# Patient Record
Sex: MALE | Race: WHITE
[De-identification: names, ages, dates, MRNs, and addresses within clinical notes are randomized per-mention and may not be internally consistent; named-entity substitution may affect disease eponyms.]

---

## 2019-02-23 ENCOUNTER — HOSPITAL ENCOUNTER (EMERGENCY)
Dept: HOSPITAL 10 - FTE | Age: 24
Discharge: HOME | End: 2019-02-23
Payer: COMMERCIAL

## 2019-02-23 ENCOUNTER — HOSPITAL ENCOUNTER (EMERGENCY)
Dept: HOSPITAL 91 - FTE | Age: 24
Discharge: HOME | End: 2019-02-23
Payer: COMMERCIAL

## 2019-02-23 VITALS — HEART RATE: 98 BPM | DIASTOLIC BLOOD PRESSURE: 79 MMHG | RESPIRATION RATE: 16 BRPM | SYSTOLIC BLOOD PRESSURE: 116 MMHG

## 2019-02-23 VITALS — WEIGHT: 126.1 LBS | HEIGHT: 60 IN | BODY MASS INDEX: 24.76 KG/M2

## 2019-02-23 DIAGNOSIS — R10.13: Primary | ICD-10-CM

## 2019-02-23 DIAGNOSIS — R11.2: ICD-10-CM

## 2019-02-23 LAB
ADD MAN DIFF?: NO
ADD UMIC: YES
ALANINE AMINOTRANSFERASE: 34 IU/L (ref 13–69)
ALBUMIN/GLOBULIN RATIO: 1.34
ALBUMIN: 5.1 G/DL (ref 3.3–4.9)
ALKALINE PHOSPHATASE: 74 IU/L (ref 42–121)
ANION GAP: 14 (ref 5–13)
ASPARTATE AMINO TRANSFERASE: 29 IU/L (ref 15–46)
BASOPHIL #: 0 10^3/UL (ref 0–0.1)
BASOPHILS %: 0.2 % (ref 0–2)
BILIRUBIN,DIRECT: 0 MG/DL (ref 0–0.2)
BILIRUBIN,TOTAL: 0.4 MG/DL (ref 0.2–1.3)
BLOOD UREA NITROGEN: 8 MG/DL (ref 7–20)
CALCIUM: 10.5 MG/DL (ref 8.4–10.2)
CARBON DIOXIDE: 31 MMOL/L (ref 21–31)
CHLORIDE: 99 MMOL/L (ref 97–110)
CREATININE: 0.85 MG/DL (ref 0.61–1.24)
EOSINOPHILS #: 0 10^3/UL (ref 0–0.5)
EOSINOPHILS %: 0.1 % (ref 0–7)
GLOBULIN: 3.8 G/DL (ref 1.3–3.2)
GLUCOSE: 116 MG/DL (ref 70–220)
HEMATOCRIT: 49.5 % (ref 42–52)
HEMOGLOBIN: 16.1 G/DL (ref 14–18)
IMMATURE GRANS #M: 0.1 10^3/UL (ref 0–0.03)
IMMATURE GRANS % (M): 1.1 % (ref 0–0.43)
LIPASE: 56 U/L (ref 23–300)
LYMPHOCYTES #: 1.5 10^3/UL (ref 0.8–2.9)
LYMPHOCYTES %: 16.7 % (ref 15–51)
MEAN CORPUSCULAR HEMOGLOBIN: 26.3 PG (ref 29–33)
MEAN CORPUSCULAR HGB CONC: 32.5 G/DL (ref 32–37)
MEAN CORPUSCULAR VOLUME: 80.8 FL (ref 82–101)
MEAN PLATELET VOLUME: 8.7 FL (ref 7.4–10.4)
MONOCYTE #: 0.6 10^3/UL (ref 0.3–0.9)
MONOCYTES %: 7 % (ref 0–11)
NEUTROPHIL #: 6.5 10^3/UL (ref 1.6–7.5)
NEUTROPHILS %: 74.9 % (ref 39–77)
NUCLEATED RED BLOOD CELLS #: 0 10^3/UL (ref 0–0)
NUCLEATED RED BLOOD CELLS%: 0 /100WBC (ref 0–0)
PLATELET COUNT: 326 10^3/UL (ref 140–415)
POTASSIUM: 3.7 MMOL/L (ref 3.5–5.1)
RED BLOOD COUNT: 6.13 10^6/UL (ref 4.7–6.1)
RED CELL DISTRIBUTION WIDTH: 13.1 % (ref 11.5–14.5)
SODIUM: 144 MMOL/L (ref 135–144)
TOTAL PROTEIN: 8.9 G/DL (ref 6.1–8.1)
UR ASCORBIC ACID: NEGATIVE MG/DL
UR BILIRUBIN (DIP): NEGATIVE MG/DL
UR BLOOD (DIP): (no result) MG/DL
UR CLARITY: CLEAR
UR COLOR: YELLOW
UR GLUCOSE (DIP): NEGATIVE MG/DL
UR KETONES (DIP): (no result) MG/DL
UR LEUKOCYTE ESTERASE (DIP): NEGATIVE LEU/UL
UR NITRITE (DIP): NEGATIVE MG/DL
UR PH (DIP): 7 (ref 5–9)
UR RBC: 3 /HPF (ref 0–5)
UR SPECIFIC GRAVITY (DIP): 1.01 (ref 1–1.03)
UR TOTAL PROTEIN (DIP): NEGATIVE MG/DL
UR UROBILINOGEN (DIP): NEGATIVE MG/DL
UR WBC: 1 /HPF (ref 0–5)
WHITE BLOOD COUNT: 8.7 10^3/UL (ref 4.8–10.8)

## 2019-02-23 PROCEDURE — 85025 COMPLETE CBC W/AUTO DIFF WBC: CPT

## 2019-02-23 PROCEDURE — 76705 ECHO EXAM OF ABDOMEN: CPT

## 2019-02-23 PROCEDURE — 81001 URINALYSIS AUTO W/SCOPE: CPT

## 2019-02-23 PROCEDURE — 80053 COMPREHEN METABOLIC PANEL: CPT

## 2019-02-23 PROCEDURE — 99284 EMERGENCY DEPT VISIT MOD MDM: CPT

## 2019-02-23 PROCEDURE — 36415 COLL VENOUS BLD VENIPUNCTURE: CPT

## 2019-02-23 PROCEDURE — 83690 ASSAY OF LIPASE: CPT

## 2019-02-23 RX ADMIN — ONDANSETRON 1 MG: 4 TABLET, ORALLY DISINTEGRATING ORAL at 12:02

## 2019-02-23 RX ADMIN — ACETAMINOPHEN 1 MG: 500 TABLET, FILM COATED ORAL at 12:02

## 2019-02-23 RX ADMIN — ALUMINUM HYDROXIDE, MAGNESIUM HYDROXIDE, DIMETHICONE 1 ML: 200; 200; 20 SUSPENSION ORAL at 12:03

## 2019-02-26 NOTE — ERD
ER Documentation


Chief Complaint


Chief Complaint





AP PAIN X 3 DAYS WITH N/V





HPI


23-year-old male presents with epigastric pain and vomiting for last 3 days.  


Denies any fevers, lower abdominal pain, diarrhea, urinary complaints.





ROS


All systems reviewed and are negative except as per history of present illness.





Medications


Home Meds


Active Scripts


Ondansetron (Ondansetron Odt) 8 Mg Tab.rapdis, 8 MG PO Q6H PRN for NAUSEA AND/OR


VOMITING, #6 TAB


   Prov:HIPOLITO GUADARRAMA MD         2/23/19


Acetaminophen* (Tylophen*) 500 Mg Capsule, 1 CAP PO Q6H PRN for PAIN AND OR 


ELEVATED TEMP, #15 CAP


   Prov:HIPOLITO GUADARRAMA MD         2/23/19


Famotidine* (Famotidine*) 20 Mg Tablet, 20 MG PO BID for 14 Days, #30 TAB


   Prov:HIPOLITO GUADARRAMA MD         2/23/19





Allergies


Allergies:  


Coded Allergies:  


     No Known Allergy (Unverified , 2/23/19)





PMhx/Soc


Medical and Surgical Hx:  pt denies Medical Hx, pt denies Surgical Hx


Hx Alcohol Use:  No


Hx Substance Use:  No


Hx Tobacco Use:  No


Smoking Status:  Never smoker





FmHx


Family History:  No diabetes, No coronary disease, No other





Physical Exam


Vitals





Vital Signs


  Date      Temp  Pulse  Resp  B/P (MAP)   Pulse Ox  O2          O2 Flow    FiO2


Time                                                 Delivery    Rate


   2/23/19  98.8     98    16      116/79        99  Room Air


     14:12                           (91)


   2/23/19  36.8


     12:02


   2/23/19  98.2    120    18      141/88        99


     11:15                          (105)





Physical Exam


Const:   No acute distress


Head:   Atraumatic 


Eyes:    Normal Conjunctiva


ENT:    Normal External Ears, Nose and Mouth.


Neck:               Full range of motion. No meningismus.


Resp:   Clear to auscultation bilaterally


Cardio:   Regular rate and rhythm, no murmurs


Abd:    Soft, non tender, non distended. Normal bowel sounds


Skin:   No petechiae or rashes


Back:   No midline or flank tenderness


Ext:    No cyanosis, or edema


Neur:   Awake and alert


Psych:    Normal Mood and Affect


Result Diagram:  


2/23/19 1157                                                                    


           2/23/19 1157





Results 24 hrs





Laboratory Tests


              Test
                                  2/23/19
11:57


              White Blood Count                       8.7 10^3/ul


              Red Blood Count                        6.13 10^6/ul


              Hemoglobin                                16.1 g/dl


              Hematocrit                                   49.5 %


              Mean Corpuscular Volume                     80.8 fl


              Mean Corpuscular Hemoglobin                 26.3 pg


              Mean Corpuscular Hemoglobin
Concent      32.5 g/dl 



              Red Cell Distribution Width                  13.1 %


              Platelet Count                          326 10^3/UL


              Mean Platelet Volume                         8.7 fl


              Immature Granulocytes %                     1.100 %


              Neutrophils %                                74.9 %


              Lymphocytes %                                16.7 %


              Monocytes %                                   7.0 %


              Eosinophils %                                 0.1 %


              Basophils %                                   0.2 %


              Nucleated Red Blood Cells %             0.0 /100WBC


              Immature Granulocytes #               0.100 10^3/ul


              Neutrophils #                           6.5 10^3/ul


              Lymphocytes #                           1.5 10^3/ul


              Monocytes #                             0.6 10^3/ul


              Eosinophils #                           0.0 10^3/ul


              Basophils #                             0.0 10^3/ul


              Nucleated Red Blood Cells #             0.0 10^3/ul


              Urine Color                          YELLOW


              Urine Clarity                        CLEAR


              Urine pH                                        7.0


              Urine Specific Gravity                        1.013


              Urine Ketones                        TRACE mg/dL


              Urine Nitrite                        NEGATIVE mg/dL


              Urine Bilirubin                      NEGATIVE mg/dL


              Urine Urobilinogen                   NEGATIVE mg/dL


              Urine Leukocyte Esterase
            NEGATIVE
John/ul


              Urine Microscopic RBC                        3 /HPF


              Urine Microscopic WBC                        1 /HPF


              Urine Hemoglobin                           1+ mg/dL


              Urine Glucose                        NEGATIVE mg/dL


              Urine Total Protein                  NEGATIVE mg/dl


              Sodium Level                             144 mmol/L


              Potassium Level                          3.7 mmol/L


              Chloride Level                            99 mmol/L


              Carbon Dioxide Level                      31 mmol/L


              Anion Gap                                        14


              Blood Urea Nitrogen                         8 mg/dl


              Creatinine                               0.85 mg/dl


              Est Glomerular Filtrat Rate
mL/min   > 60 mL/min 



              Glucose Level                             116 mg/dl


              Calcium Level                            10.5 mg/dl


              Total Bilirubin                           0.4 mg/dl


              Direct Bilirubin                         0.00 mg/dl


              Indirect Bilirubin                        0.4 mg/dl


              Aspartate Amino Transf
(AST/SGOT)          29 IU/L 



              Alanine Aminotransferase
(ALT/SGPT)        34 IU/L 



              Alkaline Phosphatase                        74 IU/L


              Total Protein                              8.9 g/dl


              Albumin                                    5.1 g/dl


              Globulin                                  3.80 g/dl


              Albumin/Globulin Ratio                         1.34


              Lipase                                       56 U/L





Current Medications


 Medications
   Dose
          Sig/Brody
       Start Time
   Status  Last


 (Trade)       Ordered        Route
 PRN     Stop Time              Admin
Dose


                              Reason                                Admin


                40 ml          ONCE  STAT
    2/23/19       DC           2/23/19


Miscellaneous                 PO
            11:45
                       12:03




 Medication
                                2/23/19 11:47


 (Gi Cocktail


(2))


 Ondansetron    8 mg           ONCE  STAT
    2/23/19       DC           2/23/19


HCl
  (Zofran                 ODT
           11:45
                       12:02



Odt)                                         2/23/19 11:47


                500 mg         ONCE  STAT
    2/23/19       DC           2/23/19


Acetaminophen                 PO
            11:45
                       12:02




  (Tylenol                                  2/23/19 11:47


Tab)








Procedures/MDM


CBC and CMP, right upper quadrant ultrasound normal.  Patient given Zofran, GI 


cocktail and had resolution of pain and a reassuring abdominal exam on serial 


exam.  Patient has no significant signs of appendicitis, hepatobiliary disease, 


surgical abdomen.  He may have gastritis or self-limited viral illness.  Will be


discharged home with medication for symptomatic treatment, primary care follow-


up and return precautions.  He is advised to return the next 8-12 hours for 


lower abdominal pain, fevers, vomiting, new or worsening symptoms.  The patient 


was stable with no new complaints during the ER course. Clinically, there is no 


current evidence to suggest meningitis, sepsis, acute abdomen, pneumonia, 


stroke,  acute coronary syndrome, pulmonary embolism, aortic dissection or any 


other emergent condition appearing to require further evaluation or 


hospitalization.  Patient counseled regarding my diagnostic impression and care 


plan. Prior to discharge all questions answered. Pt agrees with treatment plan 


and understands strict return precautions. Pt is instructed to follow up with 


primary care provider within 24-48 hours. Precautionary instructions provided 


including instructions to return to the ER if not improving or for any worsening


or changing symptoms or concerns.





Departure


Diagnosis:  


   Primary Impression:  


   Abdominal pain


   Abdominal location:  epigastric  Qualified Codes:  R10.13 - Epigastric pain


Condition:  Stable


Patient Instructions:  Abdominal Pain, Gastritis (Adult)


Referrals:  


DOCTOR,NOT ON STAFF (PCP)





Additional Instructions:  


Examinations normal today.  May be gastritis.  Recheck for lower abdominal pain,


fevers, vomiting, new worsening symptoms.











HIPOLITO GUADARRAMA MD             Feb 26, 2019 14:47